# Patient Record
Sex: FEMALE
[De-identification: names, ages, dates, MRNs, and addresses within clinical notes are randomized per-mention and may not be internally consistent; named-entity substitution may affect disease eponyms.]

---
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
## 2020-09-30 ENCOUNTER — HOSPITAL ENCOUNTER (EMERGENCY)
Dept: HOSPITAL 5 - ED | Age: 20
End: 2020-09-30
Payer: MEDICAID

## 2020-09-30 VITALS — DIASTOLIC BLOOD PRESSURE: 44 MMHG | SYSTOLIC BLOOD PRESSURE: 91 MMHG

## 2020-09-30 DIAGNOSIS — O21.8: ICD-10-CM

## 2020-09-30 DIAGNOSIS — Z53.21: ICD-10-CM

## 2020-09-30 DIAGNOSIS — Z3A.14: ICD-10-CM

## 2020-09-30 DIAGNOSIS — O20.8: Primary | ICD-10-CM

## 2020-09-30 LAB
BASOPHILS # (AUTO): 0 K/MM3 (ref 0–0.1)
BASOPHILS NFR BLD AUTO: 0.3 % (ref 0–1.8)
EOSINOPHIL # BLD AUTO: 0.1 K/MM3 (ref 0–0.4)
EOSINOPHIL NFR BLD AUTO: 0.9 % (ref 0–4.3)
HCT VFR BLD CALC: 36.2 % (ref 30.3–42.9)
HGB BLD-MCNC: 12.2 GM/DL (ref 10.1–14.3)
LYMPHOCYTES # BLD AUTO: 1.3 K/MM3 (ref 1.2–5.4)
LYMPHOCYTES NFR BLD AUTO: 23.4 % (ref 13.4–35)
MCHC RBC AUTO-ENTMCNC: 34 % (ref 30–34)
MCV RBC AUTO: 87 FL (ref 79–97)
MONOCYTES # (AUTO): 0.4 K/MM3 (ref 0–0.8)
MONOCYTES % (AUTO): 6.4 % (ref 0–7.3)
PLATELET # BLD: 162 K/MM3 (ref 140–440)
RBC # BLD AUTO: 4.17 M/MM3 (ref 3.65–5.03)

## 2020-09-30 PROCEDURE — 86900 BLOOD TYPING SEROLOGIC ABO: CPT

## 2020-09-30 PROCEDURE — 84702 CHORIONIC GONADOTROPIN TEST: CPT

## 2020-09-30 PROCEDURE — 76801 OB US < 14 WKS SINGLE FETUS: CPT

## 2020-09-30 PROCEDURE — 36415 COLL VENOUS BLD VENIPUNCTURE: CPT

## 2020-09-30 PROCEDURE — 86901 BLOOD TYPING SEROLOGIC RH(D): CPT

## 2020-09-30 PROCEDURE — 85025 COMPLETE CBC W/AUTO DIFF WBC: CPT

## 2020-09-30 NOTE — ULTRASOUND REPORT
ULTRASOUND OBSTETRIC 



INDICATION / CLINICAL INFORMATION:

vaginal bleeding.





TECHNIQUE:

Transabdominal.



COMPARISON:

None available.



FINDINGS:

GESTATIONAL SAC: Well-defined oval shape and intrauterine in location. Gestational sac measures 1.22 
cm, corresponding to 6 weeks, 0 days.





EMBRYO/FETUS: No fetal pole identified.



ADNEXA: No significant abnormality.

FREE FLUID: Small amount of free fluid in the cul-de-sac.



ADDITIONAL FINDINGS: None.



IMPRESSION:

There is a gestational sac in the uterus with sac size corresponding to 6 weeks, 0 days. No fetal wale
e is identified. Recommend correlation with clinical dating and also potentially hcg value to determi
ne pregnancy viability.



Signer Name: Bubba Brenner MD 

Signed: 9/30/2020 7:09 AM

Workstation Name: [id number]

## 2021-06-19 ENCOUNTER — HOSPITAL ENCOUNTER (EMERGENCY)
Dept: HOSPITAL 5 - ED | Age: 21
Discharge: HOME | End: 2021-06-19
Payer: MEDICAID

## 2021-06-19 VITALS — DIASTOLIC BLOOD PRESSURE: 66 MMHG | SYSTOLIC BLOOD PRESSURE: 114 MMHG

## 2021-06-19 DIAGNOSIS — Z3A.12: ICD-10-CM

## 2021-06-19 DIAGNOSIS — Z79.899: ICD-10-CM

## 2021-06-19 DIAGNOSIS — J45.909: ICD-10-CM

## 2021-06-19 DIAGNOSIS — E86.0: ICD-10-CM

## 2021-06-19 DIAGNOSIS — O26.891: Primary | ICD-10-CM

## 2021-06-19 LAB
ALBUMIN SERPL-MCNC: 4.4 G/DL (ref 3.9–5)
ALT SERPL-CCNC: 14 UNITS/L (ref 7–56)
BASOPHILS # (AUTO): 0 K/MM3 (ref 0–0.1)
BASOPHILS NFR BLD AUTO: 0.3 % (ref 0–1.8)
BILIRUB UR QL STRIP: (no result)
BLOOD UR QL VISUAL: (no result)
BUN SERPL-MCNC: 4 MG/DL (ref 7–17)
BUN/CREAT SERPL: 8 %
CALCIUM SERPL-MCNC: 9.4 MG/DL (ref 8.4–10.2)
EOSINOPHIL # BLD AUTO: 0 K/MM3 (ref 0–0.4)
EOSINOPHIL NFR BLD AUTO: 0.1 % (ref 0–4.3)
HCT VFR BLD CALC: 39.2 % (ref 30.3–42.9)
HEMOLYSIS INDEX: 6
HGB BLD-MCNC: 13.3 GM/DL (ref 10.1–14.3)
LYMPHOCYTES # BLD AUTO: 0.6 K/MM3 (ref 1.2–5.4)
LYMPHOCYTES NFR BLD AUTO: 9.1 % (ref 13.4–35)
MCHC RBC AUTO-ENTMCNC: 34 % (ref 30–34)
MCV RBC AUTO: 84 FL (ref 79–97)
MONOCYTES # (AUTO): 0.3 K/MM3 (ref 0–0.8)
MONOCYTES % (AUTO): 3.8 % (ref 0–7.3)
MUCOUS THREADS #/AREA URNS HPF: (no result) /HPF
PH UR STRIP: 5 [PH] (ref 5–7)
PLATELET # BLD: 223 K/MM3 (ref 140–440)
RBC # BLD AUTO: 4.7 M/MM3 (ref 3.65–5.03)
RBC #/AREA URNS HPF: 3 /HPF (ref 0–6)
UROBILINOGEN UR-MCNC: < 2 MG/DL (ref ?–2)
WBC #/AREA URNS HPF: 1 /HPF (ref 0–6)

## 2021-06-19 PROCEDURE — 82550 ASSAY OF CK (CPK): CPT

## 2021-06-19 PROCEDURE — 83690 ASSAY OF LIPASE: CPT

## 2021-06-19 PROCEDURE — 96365 THER/PROPH/DIAG IV INF INIT: CPT

## 2021-06-19 PROCEDURE — 81001 URINALYSIS AUTO W/SCOPE: CPT

## 2021-06-19 PROCEDURE — 99283 EMERGENCY DEPT VISIT LOW MDM: CPT

## 2021-06-19 PROCEDURE — 96375 TX/PRO/DX INJ NEW DRUG ADDON: CPT

## 2021-06-19 PROCEDURE — 80053 COMPREHEN METABOLIC PANEL: CPT

## 2021-06-19 PROCEDURE — 85025 COMPLETE CBC W/AUTO DIFF WBC: CPT

## 2021-06-19 PROCEDURE — 36415 COLL VENOUS BLD VENIPUNCTURE: CPT

## 2021-06-19 PROCEDURE — 96361 HYDRATE IV INFUSION ADD-ON: CPT

## 2021-06-19 NOTE — EVENT NOTE
ED Screening Note


Date of service: 21


Time: 11:40


ED Screening Note: 


21-year-old pregnant female (A2; 12 weeks gestation) presents to the 

emergency department with complaints of nausea and vomiting for 3 days.  Patient

states she was treated for similar symptoms at an urgent care facility in 

[place] and diagnosed with "heat stroke."  She has been unable to keep down her 

antiemetics or prenatal vitamins.  She has been evaluated by an obstetrician and

her pregnancy has reportedly been progressing normally.  No known sick contacts.

 No vaginal bleeding.





Tachycardic in triage.





General: Awake, appropriately interactive.  Appears fatigued. 


Neck: Supple. Full range of motion intact. 


Cardiovascular: Normal peripheral perfusion. 


Pulmonary: No respiratory distress. Patient is speaking normally without use of 

accessory muscles.


Skin: No apparent rashes or lesions. 


Neurological: No facial asymmetry. Speech is clear. Follows commands. Patient is

alert and oriented. 


Musculoskeletal: Moves all four extremities spontaneously with normal range of 

motion. 


Psych: Cooperative. Appropriate mood and affect.





I have greeted and performed a focused rapid initial assessment of this patient.

 A comprehensive ED assessment and evaluation of the patient, analysis of all 

test results, and completion of the medical decision-making process will be 

conducted by additional ED providers. This initial assessment/diagnostic 

orders/clinical plan/treatment(s) is/are subject to change based on patients 

health status, clinical progression and re-assessment. Further treatment and 

workup at subsequent clinical provider's discretion. Patient/guardian urged not 

to elope from the ED as their condition may be serious if not clinically 

assessed and managed.

## 2021-06-19 NOTE — EMERGENCY DEPARTMENT REPORT
ED General Adult HPI





- General


Chief complaint: Nausea/Vomiting/Diarrhea


Stated complaint: VOMITING FOR 3 DAYS


Time Seen by Provider: 21 11:52


Source: patient


Mode of arrival: Ambulatory


Limitations: No Limitations





- History of Present Illness


Initial comments: 





21-year-old pregnant female (A2; 12 weeks gestation) presents to the 

emergency department with complaints of nausea and vomiting for 3 days.  Patient

states she was treated for similar symptoms at an urgent care facility in 

[place] and diagnosed with "heat stroke."  She has been unable to keep down her 

antiemetics or prenatal vitamins.  She has been evaluated by an obstetrician and

her pregnancy has reportedly been progressing normally.  No known sick contacts.

 No current steroid or antibiotic use.  Denies fever, chills, chest pain, 

shortness of breath, hematemesis, abdominal pain, pelvic pain, vaginal bleeding,

vaginal discharge, diarrhea.  Denies all other complaints at this time.








- Related Data


                                  Previous Rx's











 Medication  Instructions  Recorded  Last Taken  Type


 


Doxylamine Succinate/Vit B6 2 each PO QHS 14 Days  tablet. 21 Unknown Rx





[Doxylamine-Pyridoxine 10-10 mg]    


 


Ondansetron [Zofran Odt] 4 mg PO Q4H #20 tab.rapdis 21 Unknown Rx











                                    Allergies











Allergy/AdvReac Type Severity Reaction Status Date / Time


 


No Known Allergies Allergy   Verified 17 22:54














ED Review of Systems


ROS: 


Stated complaint: VOMITING FOR 3 DAYS


Other details as noted in HPI





Other: 





GENERAL: Positive for generalized weakness.


ENT: Negative for ear pain, difficulty hearing, sore throat, nasal congestion, 

epistaxis.


CARDIOVASCULAR: Negative for chest pain, palpitations, lower extremity swelling.




PULMONARY: Negative for cough, dyspnea, wheezing, orthopnea, cyanosis. 


GASTROINTESTINAL: Positive for nausea and vomiting


MUSCULOSKELETAL: Negative for joint pain, joint swelling, myalgias, back pain, 

neck pain. 


NEUROLOGICAL: Negative for headache, seizure, syncope, paresthesias, weakness. 


INTEGUMENTARY: Negative for erythema, rash, diaphoresis, laceration, ecchymosis.




HEMATOLOGICAL: Negative for hemoptysis, hematemesis, hematochezia, hematuria.


PSYCHIATRIC: Negative for hallucinations, suicidal ideation, homicidal ideation,

anxiety, depression.





ED Past Medical Hx





- Past Medical History


Previous Medical History?: Yes


Hx Asthma: Yes


Additional medical history: Ectopic Pregnancy





- Surgical History


Past Surgical History?: No





- Social History


Smoking Status: Never Smoker


Substance Use Type: None





- Medications


Home Medications: 


                                Home Medications











 Medication  Instructions  Recorded  Confirmed  Last Taken  Type


 


Doxylamine Succinate/Vit B6 2 each PO QHS 14 Days  tablet. 21  Unknown 

Rx





[Doxylamine-Pyridoxine 10-10 mg]     


 


Ondansetron [Zofran Odt] 4 mg PO Q4H #20 tabander 21  Unknown Rx














ED Physical Exam





- General


Limitations: No Limitations





- Other


Other exam information: 





General: Awake and alert.  Appears fatigued.


Head: Atraumatic, normocephalic.


Eyes: EOMI. Pupils are equal and round. Normal sclera and conjunctiva. 


ENT: Oral mucosa is dry. Normal pharyngeal exam.


Neck: Supple. No lymphadenopathy.


Pulmonary: No respiratory distress. Clear to auscultation bilaterally. 


Cardiac: Tachycardic pulses are palpable and equal bilaterally. No lower 

extremity cyanosis or edema. 


Skin: Warm and dry. No rashes. 


Abdomen: Soft, non-tender, non-protuberant. No guarding, rigidity, or rebound. 

Bowel sounds are normal. No organomegaly or masses noted. 


Back: Normal alignment. No CVA tenderness.


Extremities: Symmetrical. Full range of motion intact. 


Neurological: Alert and oriented, appropriately interactive, no focal deficits.


Psych: Cooperative. Appropriate mood and affect. Speech is evenly metered. 

Thoughts are logically construed.





ED Course


                                   Vital Signs











  21





  09:55 12:46 14:45


 


Temperature 98.9 F  


 


Pulse Rate 108 H 88 82


 


Respiratory 20 16 15





Rate   


 


Blood Pressure 97/58  


 


Blood Pressure  93/47 114/66





[Right]   


 


O2 Sat by Pulse 98 99 100





Oximetry   














ED Medical Decision Making





- Lab Data


Result diagrams: 


                                 21 10:13





                                 21 10:13





- Medical Decision Making


Differential diagnosis including but not limited to: hyperemesis gravidarum, 

dehydration, electrode abnormality, hypoglycemia, urinary tract infection, heat 

related illness, pancreatitis, cholecystitis, viral illness





Patient presents to the emergency department with complaints of nausea and 

vomiting.  Labs consistent with dehydration.  No evidence of concomitant 

infection.  Abdominal exam is benign.  Patient denies vaginal bleeding.  Fetal 

heart tones within normal limits.  Patient was administered two liters of IV 

fluids, antiemetics, and thiamine replacement.  On reevaluation, patient is 

stable and symptoms have improved.  Tachycardia resolved.  She is tolerating 

oral intake without difficulty.  No further vomiting in the emergency 

department.  Patient states her nausea is currently resolved and she is 

comfortable with being discharged home.  Patient will be prescribed antiemetics 

and instructed to follow-up with her obstetrician this week.  She is under the 

care of a local obstetrician and agrees to call on Monday to schedule a follow-

up appointment.  Patient expressed understanding and is agreeable to plan of 

care.  Diet modifications discussed.  Strict return precautions provided.





Repeat exam is unremarkable and benign. History, exam, diagnostic testing, and 

current condition do not suggest worrisome pathology to warrant further testing,

 continued ED treatment, admission, or surgical evaluation at this point. Given 

the low probability of a significant medical illness, it would be more likely to

 result in harm than benefit to perform further testing at this stage. Discussed

 findings, presumptive diagnosis, need for follow-up and specific signs/symptoms

 that should prompt immediate return to the emergency department. Instructions w

ere explained in detail to the patient in addition to giving written discharge 

information. Patient expressed understanding and was given the opportunity to 

ask questions, all of which were satisfactorily answered prior to discharge 

home.





Critical care attestation.: 


If time is entered above; I have spent that time in minutes in the direct care 

of this critically ill patient, excluding procedure time.








ED Disposition


Clinical Impression: 


 Dehydration during pregnancy





Disposition: DC-01 TO HOME OR SELFCARE


Is pt being admited?: No


Does the pt Need Aspirin: No


Condition: Stable


Instructions:  Dehydration, Adult


Additional Instructions: 


Take medications as directed for nausea/vomiting.


Rest.  Drink plenty of fluids.


Gradually advance diet slowly as tolerated.


Follow-up with your obstetrician this week.  Call Monday to schedule an 

appointment.


Return to the emergency department immediately for new or worsening symptoms.


Specifically, return to the emergency department immediately for fever, 

worsening vomiting, abdominal pain, pelvic pain, vaginal bleeding, mental status

 changes, loss of consciousness, or any other concerns.


Prescriptions: 


Doxylamine Succinate/Vit B6 [Doxylamine-Pyridoxine 10-10 mg] 2 each PO QHS 14 

Days  tablet.


Ondansetron [Zofran Odt] 4 mg PO Q4H #20 tab.rapdis


Referrals: 


CARLYN HANLEY MD [Staff Physician] - 3-5 Days


Time of Disposition: 14:17